# Patient Record
Sex: FEMALE | Race: WHITE | NOT HISPANIC OR LATINO
[De-identification: names, ages, dates, MRNs, and addresses within clinical notes are randomized per-mention and may not be internally consistent; named-entity substitution may affect disease eponyms.]

---

## 2020-10-16 ENCOUNTER — APPOINTMENT (OUTPATIENT)
Dept: CARDIOLOGY | Facility: CLINIC | Age: 80
End: 2020-10-16
Payer: MEDICARE

## 2020-10-16 ENCOUNTER — NON-APPOINTMENT (OUTPATIENT)
Age: 80
End: 2020-10-16

## 2020-10-16 VITALS
WEIGHT: 154 LBS | BODY MASS INDEX: 29.07 KG/M2 | DIASTOLIC BLOOD PRESSURE: 64 MMHG | HEIGHT: 61 IN | HEART RATE: 78 BPM | SYSTOLIC BLOOD PRESSURE: 160 MMHG | OXYGEN SATURATION: 97 %

## 2020-10-16 DIAGNOSIS — Z82.49 FAMILY HISTORY OF ISCHEMIC HEART DISEASE AND OTHER DISEASES OF THE CIRCULATORY SYSTEM: ICD-10-CM

## 2020-10-16 DIAGNOSIS — Z87.891 PERSONAL HISTORY OF NICOTINE DEPENDENCE: ICD-10-CM

## 2020-10-16 DIAGNOSIS — Z80.1 FAMILY HISTORY OF MALIGNANT NEOPLASM OF TRACHEA, BRONCHUS AND LUNG: ICD-10-CM

## 2020-10-16 DIAGNOSIS — Z80.6 FAMILY HISTORY OF LEUKEMIA: ICD-10-CM

## 2020-10-16 DIAGNOSIS — Z83.6 FAMILY HISTORY OF OTHER DISEASES OF THE RESPIRATORY SYSTEM: ICD-10-CM

## 2020-10-16 DIAGNOSIS — R09.89 OTHER SPECIFIED SYMPTOMS AND SIGNS INVOLVING THE CIRCULATORY AND RESPIRATORY SYSTEMS: ICD-10-CM

## 2020-10-16 DIAGNOSIS — Z78.9 OTHER SPECIFIED HEALTH STATUS: ICD-10-CM

## 2020-10-16 PROCEDURE — 99204 OFFICE O/P NEW MOD 45 MIN: CPT

## 2020-10-16 PROCEDURE — 93000 ELECTROCARDIOGRAM COMPLETE: CPT

## 2020-10-16 RX ORDER — ACETAMINOPHEN 500 MG
1200-1000 TABLET ORAL
Refills: 0 | Status: ACTIVE | COMMUNITY
Start: 2020-10-16

## 2020-10-16 RX ORDER — PANTOPRAZOLE 40 MG/1
40 TABLET, DELAYED RELEASE ORAL DAILY
Qty: 30 | Refills: 0 | Status: ACTIVE | COMMUNITY
Start: 2020-10-16

## 2020-10-16 RX ORDER — CHOLECALCIFEROL (VITAMIN D3) 25 MCG
25 MCG TABLET,CHEWABLE ORAL
Refills: 0 | Status: ACTIVE | COMMUNITY
Start: 2020-10-16

## 2020-10-16 RX ORDER — ASPIRIN ENTERIC COATED TABLETS 81 MG 81 MG/1
81 TABLET, DELAYED RELEASE ORAL
Qty: 90 | Refills: 3 | Status: ACTIVE | COMMUNITY
Start: 2020-10-16

## 2020-10-16 RX ORDER — NICOTINE POLACRILEX 2 MG
1 GUM BUCCAL
Refills: 0 | Status: ACTIVE | COMMUNITY
Start: 2020-10-16

## 2020-10-16 RX ORDER — MULTIVITAMIN
CAPSULE ORAL
Refills: 0 | Status: ACTIVE | COMMUNITY
Start: 2020-10-16

## 2020-10-16 RX ORDER — OLIVE LEAF EXTRACT 250 MG
300 CAPSULE ORAL
Refills: 0 | Status: ACTIVE | COMMUNITY
Start: 2020-10-16

## 2020-10-16 NOTE — ASSESSMENT
[FreeTextEntry1] : Reviewed on October 16, 2020\par Labs September 24, 2020 hemoglobin is 9.9 MCV 80.2\par EKG September 28, 2020 normal sinus rhythm right bundle branch block\par EKG October 16, 2020 as noted above\par Recent chest x-ray September 28, 2020 no bibasilar atelectasis\par Prior cardiovascular tests available to me July 22, 2016 echo hyperdynamic LV trace mitral and tricuspid regurgitation normal PASP\par Nuclear marker perfusion scan June 18, 2012 nonischemic

## 2020-10-16 NOTE — PHYSICAL EXAM
[Normal Appearance] : normal appearance [General Appearance - Well Developed] : well developed [Well Groomed] : well groomed [General Appearance - Well Nourished] : well nourished [General Appearance - In No Acute Distress] : no acute distress [Normal Conjunctiva] : the conjunctiva exhibited no abnormalities [No Deformities] : no deformities [Eyelids - No Xanthelasma] : the eyelids demonstrated no xanthelasmas [Normal] : normal [Normal S2] : normal S2 [Normal S1] : normal S1 [No Precordial Heave] : no precordial heave was noted [Normal Rate] : normal [S4] : no S4 [S3] : no S3 [I] : a grade 1 [Right Carotid Bruit] : no bruit heard over the right carotid [1+] : left 1+ [Left Carotid Bruit] : left carotid bruit heard [Right Femoral Bruit] : no bruit heard over the right femoral artery [Left Femoral Bruit] : no bruit heard over the left femoral artery [2+] : left 2+ [No Abnormalities] : the abdominal aorta was not enlarged and no bruit was heard [___ +] : bilateral [unfilled]U+ pitting edema to the ankles [Exaggerated Use Of Accessory Muscles For Inspiration] : no accessory muscle use [Respiration, Rhythm And Depth] : normal respiratory rhythm and effort [Abdomen Soft] : soft [Auscultation Breath Sounds / Voice Sounds] : lungs were clear to auscultation bilaterally [Abnormal Walk] : normal gait [Gait - Sufficient For Exercise Testing] : the gait was sufficient for exercise testing [Nail Clubbing] : no clubbing of the fingernails [Cyanosis, Localized] : no localized cyanosis [FreeTextEntry1] : Venous stasis change venous stasis changes [] : no rash [Skin Color & Pigmentation] : normal skin color and pigmentation [No Skin Ulcers] : no skin ulcer [Mood] : the mood was normal [Affect] : the affect was normal [Oriented To Time, Place, And Person] : oriented to person, place, and time [No Anxiety] : not feeling anxious

## 2020-10-16 NOTE — HISTORY OF PRESENT ILLNESS
[FreeTextEntry1] : Medical history significant for\par 1.  Peripheral arterial disease bilateral lower extremity interventions with iliac artery stents in December, January and May\par 2.  Hyperlipidemia on low-dose of rosuvastatin\par 3.  Venous insufficiency with bilateral lower extremity edema\par 4.  Bronchospastic airway disease

## 2020-10-16 NOTE — DISCUSSION/SUMMARY
[FreeTextEntry1] : 79-year-old female with above medical history active medical problems as noted below\par 1.  Left carotid bruit.  Carotid Doppler study in presence of generalized vascular disease\par 2.  Increasing ankle edema bilaterally lower extremity.  More related to venous and arterial insufficiency.  Rule out cardiovascular etiology with echocardiogram.  Torsemide as advised as needed.  She will let us know after use of it over the next 3 days.  Labs to be ordered.  She will follow her weight.  Low-salt diet.  Leg elevation.\par 3.  Chronic bifascicular block.\par 4.  Peripheral arterial disease.  Bilateral intervention.  Lipid panel.  If LDL cholesterol greater than 70 increase rosuvastatin to 20 mg.  Increasing activity exercise would be helpful.  Aspirin 81 mg as long as no significant contraindication lifelong.\par 5.  Bilateral venous insufficiency.  Prior intervention.  Compression stockings recommended\par 6.  Once stable in relation to anemia we will consider ischemic evaluation considering significant peripheral arterial disease with intervention high likelihood of coronary artery disease\par \par Counseling regarding low saturated fat, salt and carbohydrate intake was reviewed. Active lifestyle and regular. Exercise along with weight management is advised.\par All the above were at length reviewed. Answered all the questions. Thank you very much for this kind referral. Please do not hesitate to give me a call for any question.\par Part of this transcription was done with voice recognition software and phonetically similar errors are common. I apologize for that. Please do not hesitate to call for any questions due to above.\par

## 2020-10-16 NOTE — REASON FOR VISIT
[Consultation] : a consultation regarding [Abnormal ECG] : an abnormal ECG [Peripheral Vascular Disease] : peripheral vascular disease [Hyperlipidemia] : hyperlipidemia [FreeTextEntry1] : 79-year-old female comes in for consultation.  Referred in relation to cardiovascular evaluation management in presence of history of peripheral arterial and venous disease with multiple intervention bilaterally in lower extremity for both venous insufficiency and arterial insufficiency, right bundle branch block, hyperlipidemia.\par Her activity level is limited\par She has no chest pain PND orthopnea\par She has bilateral lower extremity edema as she has not been taking her torsemide\par She also has significant anemia which is being evaluated with hematologist\par So far no significant etiology noted\par She has no palpitation dizziness syncopal event\par She wears intermittent compression stockings\par She had recent hospital admission for anemia with transfusion\par

## 2020-10-23 ENCOUNTER — APPOINTMENT (OUTPATIENT)
Dept: CARDIOLOGY | Facility: CLINIC | Age: 80
End: 2020-10-23
Payer: MEDICARE

## 2020-10-23 PROCEDURE — 93306 TTE W/DOPPLER COMPLETE: CPT

## 2020-10-23 PROCEDURE — 93880 EXTRACRANIAL BILAT STUDY: CPT

## 2020-10-27 ENCOUNTER — NON-APPOINTMENT (OUTPATIENT)
Age: 80
End: 2020-10-27

## 2020-11-13 ENCOUNTER — APPOINTMENT (OUTPATIENT)
Dept: CARDIOLOGY | Facility: CLINIC | Age: 80
End: 2020-11-13
Payer: MEDICARE

## 2020-11-13 VITALS
SYSTOLIC BLOOD PRESSURE: 142 MMHG | HEART RATE: 85 BPM | WEIGHT: 153 LBS | HEIGHT: 61 IN | OXYGEN SATURATION: 98 % | DIASTOLIC BLOOD PRESSURE: 62 MMHG | BODY MASS INDEX: 28.89 KG/M2

## 2020-11-13 DIAGNOSIS — E04.1 NONTOXIC SINGLE THYROID NODULE: ICD-10-CM

## 2020-11-13 PROCEDURE — 99215 OFFICE O/P EST HI 40 MIN: CPT

## 2020-11-13 RX ORDER — TORSEMIDE 20 MG/1
20 TABLET ORAL DAILY
Refills: 0 | Status: DISCONTINUED | COMMUNITY
Start: 2020-10-16 | End: 2020-11-13

## 2020-11-13 NOTE — ASSESSMENT
[FreeTextEntry1] : Reviewed on October 16, 2020\par Labs September 24, 2020 hemoglobin is 9.9 MCV 80.2\par EKG September 28, 2020 normal sinus rhythm right bundle branch block\par EKG October 16, 2020 as noted above\par Recent chest x-ray September 28, 2020 no bibasilar atelectasis\par Prior cardiovascular tests available to me July 22, 2016 echo hyperdynamic LV trace mitral and tricuspid regurgitation normal PASP\par Nuclear marker perfusion scan June 18, 2012 nonischemic\par \par Reviewed November 13, 2020\par Labs from October 15, 2020 were reviewed\par Echocardiogram October 23, 2020 as noted above\par Carotid Doppler study October 23, 2020 protruding atheroma noted.

## 2020-11-13 NOTE — PHYSICAL EXAM
[General Appearance - Well Developed] : well developed [Normal Appearance] : normal appearance [Well Groomed] : well groomed [General Appearance - Well Nourished] : well nourished [No Deformities] : no deformities [General Appearance - In No Acute Distress] : no acute distress [Normal Conjunctiva] : the conjunctiva exhibited no abnormalities [Eyelids - No Xanthelasma] : the eyelids demonstrated no xanthelasmas [Respiration, Rhythm And Depth] : normal respiratory rhythm and effort [Exaggerated Use Of Accessory Muscles For Inspiration] : no accessory muscle use [Auscultation Breath Sounds / Voice Sounds] : lungs were clear to auscultation bilaterally [Abdomen Soft] : soft [Abnormal Walk] : normal gait [Gait - Sufficient For Exercise Testing] : the gait was sufficient for exercise testing [Nail Clubbing] : no clubbing of the fingernails [Cyanosis, Localized] : no localized cyanosis [Skin Color & Pigmentation] : normal skin color and pigmentation [] : no rash [No Skin Ulcers] : no skin ulcer [Oriented To Time, Place, And Person] : oriented to person, place, and time [Affect] : the affect was normal [Mood] : the mood was normal [No Anxiety] : not feeling anxious [Normal] : normal [No Precordial Heave] : no precordial heave was noted [Normal Rate] : normal [Normal S1] : normal S1 [Normal S2] : normal S2 [I] : a grade 1 [1+] : left 1+ [Left Carotid Bruit] : left carotid bruit heard [2+] : left 2+ [No Abnormalities] : the abdominal aorta was not enlarged and no bruit was heard [___ +] : bilateral [unfilled]U+ pitting edema to the ankles [FreeTextEntry1] : Venous stasis change venous stasis changes [S3] : no S3 [S4] : no S4 [Right Carotid Bruit] : no bruit heard over the right carotid [Right Femoral Bruit] : no bruit heard over the right femoral artery [Left Femoral Bruit] : no bruit heard over the left femoral artery

## 2020-11-13 NOTE — REASON FOR VISIT
[Follow-Up - Clinic] : a clinic follow-up of [Abnormal ECG] : an abnormal ECG [Hyperlipidemia] : hyperlipidemia [Peripheral Vascular Disease] : peripheral vascular disease [FreeTextEntry1] : 80-year-old comes in for follow-up consultation after echocardiogram carotid Doppler study and labs.  History of peripheral arterial and venous disease with multiple intervention bilaterally in lower extremity for both venous insufficiency and arterial insufficiency, right bundle branch block, hyperlipidemia.\par Her edema is relatively stable on torsemide which she takes it as needed based on the weight.  She will also continue to wear compression stockings.\par Her activity level is limited\par She has no chest pain PND orthopnea\par She has bilateral lower extremity edema as she has not been taking her torsemide\par She also has significant anemia which is being evaluated with hematologist\par So far no significant etiology noted\par She has no palpitation dizziness syncopal event\par She had recent hospital admission for anemia with transfusion\par

## 2020-11-13 NOTE — DISCUSSION/SUMMARY
[FreeTextEntry1] : 80-year-old female with above medical history active medical problems as noted below\par 1.  Protruding atheroma bilateral carotids.  Nonobstructive.  Increase rosuvastatin to 20 mg.  Follow-up with vascular surgery.\par 2.  Ankle edema.  Compression stockings.  Follow-up with vascular regarding venous and arterial insufficiency.  Torsemide as needed.\par 3.  Chronic bifascicular block.  No syncope.  Closely follow.\par 4.  Peripheral arterial disease.  Bilateral intervention.  Lipid panel.  If LDL cholesterol greater than 70 increase rosuvastatin to 20 mg.  Increasing activity exercise would be helpful.  Aspirin 81 mg as long as no significant contraindication lifelong.\par 5.  Bilateral venous insufficiency.  Prior intervention.  Compression stockings recommended\par 6.  Elevated blood pressure.  In presence significant generalized vascular disease.  Losartan/hydrochlorthiazide ordered.  This will minimize the use of torsemide.  Side effects risk benefits alternatives reviewed.  Repeat labs ordered in the next 3 to 4 weeks.  Any side effects she will contact us immediately.\par In future once stable in relation to anemia we will consider ischemic evaluation considering significant peripheral arterial disease with intervention high likelihood of coronary artery disease\par #7 thyroid nodule.  Thyroid ultrasound ordered.  If abnormality noted consider endocrinology follow-up\par \par Counseling regarding low saturated fat, salt and carbohydrate intake was reviewed. Active lifestyle and regular. Exercise along with weight management is advised.\par All the above were at length reviewed. Answered all the questions. Thank you very much for this kind referral. Please do not hesitate to give me a call for any question.\par Part of this transcription was done with voice recognition software and phonetically similar errors are common. I apologize for that. Please do not hesitate to call for any questions due to above.\par

## 2020-11-24 ENCOUNTER — NON-APPOINTMENT (OUTPATIENT)
Age: 80
End: 2020-11-24

## 2021-01-10 ENCOUNTER — NON-APPOINTMENT (OUTPATIENT)
Age: 81
End: 2021-01-10

## 2021-01-12 ENCOUNTER — APPOINTMENT (OUTPATIENT)
Dept: CARDIOLOGY | Facility: CLINIC | Age: 81
End: 2021-01-12

## 2021-01-26 ENCOUNTER — APPOINTMENT (OUTPATIENT)
Dept: CARDIOLOGY | Facility: CLINIC | Age: 81
End: 2021-01-26
Payer: MEDICARE

## 2021-01-26 VITALS
WEIGHT: 153 LBS | SYSTOLIC BLOOD PRESSURE: 112 MMHG | TEMPERATURE: 98.4 F | OXYGEN SATURATION: 94 % | HEIGHT: 61 IN | HEART RATE: 93 BPM | DIASTOLIC BLOOD PRESSURE: 60 MMHG | BODY MASS INDEX: 28.89 KG/M2

## 2021-01-26 DIAGNOSIS — I95.2 HYPOTENSION DUE TO DRUGS: ICD-10-CM

## 2021-01-26 DIAGNOSIS — K21.9 GASTRO-ESOPHAGEAL REFLUX DISEASE W/OUT ESOPHAGITIS: ICD-10-CM

## 2021-01-26 DIAGNOSIS — R60.0 LOCALIZED EDEMA: ICD-10-CM

## 2021-01-26 PROCEDURE — 99214 OFFICE O/P EST MOD 30 MIN: CPT

## 2021-01-26 RX ORDER — MIDODRINE HYDROCHLORIDE 2.5 MG/1
2.5 TABLET ORAL
Refills: 0 | Status: DISCONTINUED | COMMUNITY
End: 2021-01-26

## 2021-01-26 RX ORDER — LOSARTAN POTASSIUM AND HYDROCHLOROTHIAZIDE 12.5; 5 MG/1; MG/1
50-12.5 TABLET ORAL
Qty: 90 | Refills: 3 | Status: DISCONTINUED | COMMUNITY
Start: 2020-11-13 | End: 2021-01-26

## 2021-01-26 NOTE — ASSESSMENT
[FreeTextEntry1] : Hanny is an 80-year-old female with medical history detailed above and active medical issues including:\par \par -DCHF, HFpEF LVEF 65% echo Oct 2020. Persistent +2 bipedal edema in the setting of hypotension.  Patient started on midodrine 2.5 mg 3 times daily and torsemide 5 mg daily.  Patient instructed to maintain low salt diet, elevate legs when nonambulatory, knee-high compression stockings during the day and with prolonged travel.\par \par - Adrenal insufficiency work-up in progress with PCP.\par \par -PVD lateral femoral artery stents\par \par -COPD on home oxygen\par \par -History of anemia multiple transfusions September 2020\par \par -History of Billroth II surgery\par \par -Admission [facility] 1/15/21 urosepsis and pneumonia.  Readmission 1/22/2021 dizziness, hypotension, nausea vomiting, edema\par \par Advised patient to follow active lifestyle with regular cardiovascular exercise. Patient educated on lifestyle and diet modification with low sodium low fat diet and avoidance of excessive alcohol. Patient is aware to call with any symptoms or concerns. \par \par Hanny will follow up with [facility] for primary care

## 2021-01-26 NOTE — PHYSICAL EXAM
[General Appearance - Well Developed] : well developed [Normal Appearance] : normal appearance [Well Groomed] : well groomed [General Appearance - Well Nourished] : well nourished [No Deformities] : no deformities [General Appearance - In No Acute Distress] : no acute distress [Normal Conjunctiva] : the conjunctiva exhibited no abnormalities [Eyelids - No Xanthelasma] : the eyelids demonstrated no xanthelasmas [Respiration, Rhythm And Depth] : normal respiratory rhythm and effort [Exaggerated Use Of Accessory Muscles For Inspiration] : no accessory muscle use [Auscultation Breath Sounds / Voice Sounds] : lungs were clear to auscultation bilaterally [Abdomen Soft] : soft [Abnormal Walk] : normal gait [Gait - Sufficient For Exercise Testing] : the gait was sufficient for exercise testing [Nail Clubbing] : no clubbing of the fingernails [Cyanosis, Localized] : no localized cyanosis [Skin Color & Pigmentation] : normal skin color and pigmentation [No Skin Ulcers] : no skin ulcer [] : no rash [Oriented To Time, Place, And Person] : oriented to person, place, and time [Affect] : the affect was normal [Mood] : the mood was normal [No Anxiety] : not feeling anxious [Normal] : normal [No Precordial Heave] : no precordial heave was noted [Normal Rate] : normal [Normal S1] : normal S1 [Normal S2] : normal S2 [I] : a grade 1 [1+] : left 1+ [Left Carotid Bruit] : left carotid bruit heard [2+] : left 2+ [No Abnormalities] : the abdominal aorta was not enlarged and no bruit was heard [___ +] : bilateral [unfilled]U+ pitting edema to the ankles [Normal Oral Mucosa] : normal oral mucosa [No Oral Pallor] : no oral pallor [No Oral Cyanosis] : no oral cyanosis [Normal Jugular Venous A Waves Present] : normal jugular venous A waves present [Normal Jugular Venous V Waves Present] : normal jugular venous V waves present [No Jugular Venous Gomez A Waves] : no jugular venous gomez A waves [Heart Rate And Rhythm] : heart rate and rhythm were normal [Heart Sounds] : normal S1 and S2 [Murmurs] : no murmurs present [FreeTextEntry1] : Venous stasis change venous stasis changes [S3] : no S3 [S4] : no S4 [Right Carotid Bruit] : no bruit heard over the right carotid [Right Femoral Bruit] : no bruit heard over the right femoral artery [Left Femoral Bruit] : no bruit heard over the left femoral artery

## 2021-01-26 NOTE — REVIEW OF SYSTEMS
[see HPI] : see HPI [Joint Pain] : joint pain [Joint Stiffness] : joint stiffness [Negative] : Heme/Lymph [Lower Ext Edema] : lower extremity edema

## 2021-01-26 NOTE — REASON FOR VISIT
[Follow-Up - Clinic] : a clinic follow-up of [Abnormal ECG] : an abnormal ECG [Hyperlipidemia] : hyperlipidemia [Peripheral Vascular Disease] : peripheral vascular disease [FreeTextEntry1] : Hanny is an 80-year-old female with history of COPD home oxygen, DCHF, HFpEF LVEF 65% echo Oct 2020, pedal edema, PVD bilateral FA stents, anemia transfusion September 2020, Billroth II surgery, [facility] admission 1/15/21 urosepsis pneumonia readmission 1/22/2021 weakness, dizziness, nausea vomiting, hypotension, currently evaluation for adrenal insufficiency.\par \par Patient has persistent +2 bipedal edema, and borderline hypotension at home  today.  Cardiovascular review of symptoms is negative for exertional chest pain, dyspnea, palpitations, dizziness or syncope.  No PND or orthopnea.  No bleeding or black stool.\par \par No exercise routine.  Patient is walking 10 minutes without chest pain or dyspnea.\par \par EKG [facility] 1/21/2021, sinus rhythm PACs RBBB\par \par Echocardiogram October 2020, LVEF 65%, mild diastolic dysfunction, mild MR and TR, mild to moderate MS. mild pulmonary hypertension RVSP 42 mmHg\par \par

## 2021-02-19 ENCOUNTER — APPOINTMENT (OUTPATIENT)
Dept: CARDIOLOGY | Facility: CLINIC | Age: 81
End: 2021-02-19

## 2021-02-22 ENCOUNTER — OUTPATIENT (OUTPATIENT)
Dept: OUTPATIENT SERVICES | Facility: HOSPITAL | Age: 81
LOS: 1 days | End: 2021-02-22

## 2021-02-24 ENCOUNTER — OUTPATIENT (OUTPATIENT)
Dept: OUTPATIENT SERVICES | Facility: HOSPITAL | Age: 81
LOS: 1 days | End: 2021-02-24
Payer: MEDICARE

## 2021-02-24 PROCEDURE — 76775 US EXAM ABDO BACK WALL LIM: CPT | Mod: 26

## 2021-03-02 ENCOUNTER — APPOINTMENT (OUTPATIENT)
Dept: CARDIOLOGY | Facility: CLINIC | Age: 81
End: 2021-03-02

## 2021-08-20 ENCOUNTER — OUTPATIENT (OUTPATIENT)
Dept: OUTPATIENT SERVICES | Facility: HOSPITAL | Age: 81
LOS: 1 days | End: 2021-08-20

## 2021-10-06 ENCOUNTER — OUTPATIENT (OUTPATIENT)
Dept: OUTPATIENT SERVICES | Facility: HOSPITAL | Age: 81
LOS: 1 days | End: 2021-10-06

## 2022-05-20 ENCOUNTER — APPOINTMENT (OUTPATIENT)
Dept: CARDIOLOGY | Facility: CLINIC | Age: 82
End: 2022-05-20
Payer: MEDICARE

## 2022-05-20 VITALS
WEIGHT: 142 LBS | HEIGHT: 61 IN | OXYGEN SATURATION: 95 % | DIASTOLIC BLOOD PRESSURE: 58 MMHG | HEART RATE: 83 BPM | BODY MASS INDEX: 26.81 KG/M2 | SYSTOLIC BLOOD PRESSURE: 130 MMHG | TEMPERATURE: 97.1 F

## 2022-05-20 DIAGNOSIS — R03.0 ELEVATED BLOOD-PRESSURE READING, W/OUT DIAGNOSIS OF HYPERTENSION: ICD-10-CM

## 2022-05-20 DIAGNOSIS — M25.471 EFFUSION, RIGHT ANKLE: ICD-10-CM

## 2022-05-20 DIAGNOSIS — M25.472 EFFUSION, RIGHT ANKLE: ICD-10-CM

## 2022-05-20 PROCEDURE — 99214 OFFICE O/P EST MOD 30 MIN: CPT

## 2022-05-20 PROCEDURE — 93000 ELECTROCARDIOGRAM COMPLETE: CPT

## 2022-05-20 RX ORDER — MIDODRINE HYDROCHLORIDE 2.5 MG/1
2.5 TABLET ORAL 3 TIMES DAILY
Qty: 270 | Refills: 0 | Status: DISCONTINUED | COMMUNITY
Start: 2021-01-26 | End: 2022-05-20

## 2022-05-20 RX ORDER — TORSEMIDE 5 MG/1
5 TABLET ORAL DAILY
Qty: 90 | Refills: 1 | Status: DISCONTINUED | COMMUNITY
End: 2022-05-20

## 2022-05-20 RX ORDER — TIOTROPIUM BROMIDE 18 UG/1
18 CAPSULE ORAL; RESPIRATORY (INHALATION) DAILY
Refills: 0 | Status: DISCONTINUED | COMMUNITY
Start: 2020-10-16 | End: 2022-05-20

## 2022-05-20 RX ORDER — ROSUVASTATIN CALCIUM 40 MG/1
40 TABLET, FILM COATED ORAL
Qty: 90 | Refills: 3 | Status: ACTIVE | COMMUNITY
Start: 2020-10-16

## 2022-05-20 NOTE — REASON FOR VISIT
[Follow-Up - Clinic] : a clinic follow-up of [Abnormal ECG] : an abnormal ECG [Hyperlipidemia] : hyperlipidemia [Peripheral Vascular Disease] : peripheral vascular disease [FreeTextEntry1] : 81-year-old female comes in for follow-up consultation.  Reviewed available labs.  History of peripheral arterial and venous disease with multiple intervention bilaterally in lower extremity for both venous insufficiency and arterial insufficiency, right bundle branch block, hyperlipidemia.\par She is wearing compression stockings.  Off diuretics.\par Blood pressure stable now off midodrine.\par Her activity level is limited\par She has no chest pain PND orthopnea\par She has no palpitation dizziness syncopal event\par She had recent hospital admission for anemia with transfusion\par

## 2022-05-20 NOTE — ASSESSMENT
[FreeTextEntry1] : Reviewed on October 16, 2020\par Labs September 24, 2020 hemoglobin is 9.9 MCV 80.2\par EKG September 28, 2020 normal sinus rhythm right bundle branch block\par EKG October 16, 2020 as noted above\par Recent chest x-ray September 28, 2020 no bibasilar atelectasis\par Prior cardiovascular tests available to me July 22, 2016 echo hyperdynamic LV trace mitral and tricuspid regurgitation normal PASP\par Nuclear marker perfusion scan June 18, 2012 nonischemic\par \par Reviewed November 13, 2020\par Labs from October 15, 2020 were reviewed\par Echocardiogram October 23, 2020 as noted above\par Carotid Doppler study October 23, 2020 protruding atheroma noted.\par \par Reviewed on May 20, 2022.\par Labs from May 17, 2022 which includes hemoglobin of 12.7.  Creatinine 1.24 potassium 4.1 sodium 137

## 2022-05-20 NOTE — HISTORY OF PRESENT ILLNESS
[FreeTextEntry1] : Medical history significant for\par 1.  Peripheral arterial disease bilateral lower extremity interventions with iliac artery stents in December, January and May 2019 and 2020\par 2.  Hyperlipidemia on high intensity statin\par 3.  Venous insufficiency with bilateral lower extremity edema\par 4.  Bronchospastic airway disease

## 2022-05-20 NOTE — REVIEW OF SYSTEMS
[Joint Pain] : joint pain [Joint Stiffness] : joint stiffness [Negative] : Heme/Lymph [FreeTextEntry5] : Improved claudication.  Dyspnea on exertion.  No chest pain.  No palpitations.  No PND orthopnea

## 2022-05-20 NOTE — PHYSICAL EXAM
[General Appearance - Well Developed] : well developed [Normal Appearance] : normal appearance [Well Groomed] : well groomed [General Appearance - Well Nourished] : well nourished [No Deformities] : no deformities [General Appearance - In No Acute Distress] : no acute distress [Respiration, Rhythm And Depth] : normal respiratory rhythm and effort [Exaggerated Use Of Accessory Muscles For Inspiration] : no accessory muscle use [Auscultation Breath Sounds / Voice Sounds] : lungs were clear to auscultation bilaterally [Abnormal Walk] : normal gait [Gait - Sufficient For Exercise Testing] : the gait was sufficient for exercise testing [Nail Clubbing] : no clubbing of the fingernails [Cyanosis, Localized] : no localized cyanosis [Skin Color & Pigmentation] : normal skin color and pigmentation [] : no rash [No Skin Ulcers] : no skin ulcer [FreeTextEntry1] : Venous stasis change venous stasis changes

## 2022-05-23 ENCOUNTER — NON-APPOINTMENT (OUTPATIENT)
Age: 82
End: 2022-05-23

## 2022-06-07 ENCOUNTER — APPOINTMENT (OUTPATIENT)
Dept: CARDIOLOGY | Facility: CLINIC | Age: 82
End: 2022-06-07
Payer: MEDICARE

## 2022-06-07 PROCEDURE — 93306 TTE W/DOPPLER COMPLETE: CPT

## 2022-06-14 ENCOUNTER — RESULT CHARGE (OUTPATIENT)
Age: 82
End: 2022-06-14

## 2022-06-15 ENCOUNTER — APPOINTMENT (OUTPATIENT)
Dept: CARDIOLOGY | Facility: CLINIC | Age: 82
End: 2022-06-15
Payer: MEDICARE

## 2022-06-15 ENCOUNTER — NON-APPOINTMENT (OUTPATIENT)
Age: 82
End: 2022-06-15

## 2022-06-15 VITALS
SYSTOLIC BLOOD PRESSURE: 138 MMHG | DIASTOLIC BLOOD PRESSURE: 68 MMHG | OXYGEN SATURATION: 95 % | HEIGHT: 61 IN | TEMPERATURE: 97.1 F | WEIGHT: 143 LBS | HEART RATE: 96 BPM | BODY MASS INDEX: 27 KG/M2

## 2022-06-15 DIAGNOSIS — I45.10 UNSPECIFIED RIGHT BUNDLE-BRANCH BLOCK: ICD-10-CM

## 2022-06-15 DIAGNOSIS — Z95.828 PRESENCE OF OTHER VASCULAR IMPLANTS AND GRAFTS: ICD-10-CM

## 2022-06-15 PROCEDURE — 99214 OFFICE O/P EST MOD 30 MIN: CPT

## 2022-06-15 NOTE — HISTORY OF PRESENT ILLNESS
[FreeTextEntry1] : KAYLEEN ALBA  is a 81 year F  who presents today Jerome 15, 2022 for clinical follow-up.  Overall she has been feeling well. There has been no recent illness or hospital stay. Medications have remained unchanged. Asymptomatic from cardiovascular and arrhythmia standpoint. \par Walking with no new exertional complaints. Main complaint is fatigue in her legs. Following closely with vascular team. \par \par Today she denies chest pain, pressure, unusual shortness of breath, lightheadedness, dizziness, near syncope or syncope. \par \par \par Medical history significant for\par 1.  Peripheral arterial disease bilateral lower extremity interventions with iliac artery stents in December, January and May 2019 and 2020\par 2.  Hyperlipidemia on high intensity statin\par 3.  Venous insufficiency with bilateral lower extremity edema\par 4.  Bronchospastic airway disease

## 2022-06-15 NOTE — PHYSICAL EXAM
[General Appearance - Well Developed] : well developed [Normal Appearance] : normal appearance [Well Groomed] : well groomed [General Appearance - Well Nourished] : well nourished [No Deformities] : no deformities [General Appearance - In No Acute Distress] : no acute distress [Respiration, Rhythm And Depth] : normal respiratory rhythm and effort [Exaggerated Use Of Accessory Muscles For Inspiration] : no accessory muscle use [Auscultation Breath Sounds / Voice Sounds] : lungs were clear to auscultation bilaterally [Abnormal Walk] : normal gait [Gait - Sufficient For Exercise Testing] : the gait was sufficient for exercise testing [Cyanosis, Localized] : no localized cyanosis [Nail Clubbing] : no clubbing of the fingernails [FreeTextEntry1] : Venous stasis change venous stasis changes [Skin Color & Pigmentation] : normal skin color and pigmentation [] : no rash [No Skin Ulcers] : no skin ulcer

## 2022-06-15 NOTE — REASON FOR VISIT
[Follow-Up - Clinic] : a clinic follow-up of [Abnormal ECG] : an abnormal ECG [Hyperlipidemia] : hyperlipidemia [Peripheral Vascular Disease] : peripheral vascular disease

## 2022-06-15 NOTE — CARDIOLOGY SUMMARY
[de-identified] : Echo 6/7/22 Ef 65%, mod mitral stenosis  [___] : [unfilled] [LVEF ___%] : LVEF [unfilled]% [Enlarged] : enlarged LA size [Mild] : mild mitral regurgitation

## 2022-06-15 NOTE — DISCUSSION/SUMMARY
[FreeTextEntry1] : KAYLEEN ALBA  is a 81 year F  who presents today Jerome 15, 2022 with the above history and the following active issues. \par \par Multivessel arterial disease. Echo with normal LVEF and moderate MS. Recommended follow-up echocardiogram in one year. At last visit nuclear stress test recommended and patient declined. \par \par Chronic bifascicular block.  No syncope.  Closely follow.\par \par Peripheral arterial disease.  Bilateral intervention.  Continue aspirin/high intensity statin therapy.  Increasing activity exercise would be helpful.  Aspirin 81 mg as long as no significant contraindication lifelong.\par \par Bilateral venous insufficiency.  Prior intervention.  Compression stockings recommended\par \par Blood pressure stable.  In the past significant hypotension.  Requiring midodrine use.\par \par Bilateral protruding carotid atheroma.  Follow-up with carotid Doppler study.\par \par Red flag symptoms which would warrant sooner emergent evaluation reviewed with the patient. \par Questions and concerns were addressed and answered. \par Limitations of non-invasive testing reviewed. \par \par Sincerely,\par \par Ana Paula Jones PA-C\par Patients history, testing and plan reviewed with supervising MD: Dr. Kailee Diaz

## 2023-05-30 ENCOUNTER — APPOINTMENT (OUTPATIENT)
Dept: CARDIOLOGY | Facility: CLINIC | Age: 83
End: 2023-05-30
Payer: MEDICARE

## 2023-05-30 VITALS
OXYGEN SATURATION: 97 % | DIASTOLIC BLOOD PRESSURE: 72 MMHG | BODY MASS INDEX: 28.13 KG/M2 | WEIGHT: 149 LBS | HEIGHT: 61 IN | RESPIRATION RATE: 12 BRPM | HEART RATE: 80 BPM | SYSTOLIC BLOOD PRESSURE: 128 MMHG

## 2023-05-30 DIAGNOSIS — I05.2 RHEUMATIC MITRAL STENOSIS WITH INSUFFICIENCY: ICD-10-CM

## 2023-05-30 DIAGNOSIS — I73.9 PERIPHERAL VASCULAR DISEASE, UNSPECIFIED: ICD-10-CM

## 2023-05-30 DIAGNOSIS — E78.00 PURE HYPERCHOLESTEROLEMIA, UNSPECIFIED: ICD-10-CM

## 2023-05-30 DIAGNOSIS — I65.23 OCCLUSION AND STENOSIS OF BILATERAL CAROTID ARTERIES: ICD-10-CM

## 2023-05-30 PROCEDURE — 99214 OFFICE O/P EST MOD 30 MIN: CPT

## 2023-05-30 RX ORDER — ACETAMINOPHEN AND CODEINE PHOSPHATE 300; 30 MG/1; MG/1
300-30 TABLET ORAL
Qty: 16 | Refills: 0 | Status: ACTIVE | COMMUNITY
Start: 2023-05-09

## 2023-05-30 RX ORDER — IBANDRONATE SODIUM 150 MG/1
150 TABLET ORAL
Qty: 1 | Refills: 0 | Status: ACTIVE | COMMUNITY
Start: 2023-05-23

## 2023-05-30 RX ORDER — ALBUTEROL SULFATE 90 UG/1
108 (90 BASE) INHALANT RESPIRATORY (INHALATION)
Qty: 7 | Refills: 0 | Status: ACTIVE | COMMUNITY
Start: 2023-01-28

## 2023-05-30 RX ORDER — FLUTICASONE FUROATE, UMECLIDINIUM BROMIDE AND VILANTEROL TRIFENATATE 200; 62.5; 25 UG/1; UG/1; UG/1
200-62.5-25 POWDER RESPIRATORY (INHALATION)
Refills: 0 | Status: ACTIVE | COMMUNITY

## 2023-05-30 NOTE — DISCUSSION/SUMMARY
[FreeTextEntry1] : KAYLEEN ALBA  is a 82 year F  who presents today May 30 8/20/2023 with the above history and the following active issues. \par \par Multivessel arterial disease. Echo with normal LVEF and moderate MS. Recommended follow-up echocardiogram . At last visit nuclear stress test recommended and patient declined. \par \par Chronic bifascicular block.  No syncope.  Closely follow.  EKG done in [place] Will obtain\par \par Peripheral arterial disease.  Bilateral intervention.  Continue aspirin/high intensity statin therapy.  Increasing activity exercise would be helpful.  Aspirin 81 mg as long as no significant contraindication lifelong.\par \par Dyslipidemia on rosuvastatin tolerating it well LDL goal less than 70\par \par Bilateral venous insufficiency.  Prior intervention.  Compression stockings recommended\par \par Blood pressure stable.  In the past significant hypotension.  Requiring midodrine use.\par \par Bilateral protruding carotid atheroma.  Follow-up with carotid Doppler study.\par \par Red flag symptoms which would warrant sooner emergent evaluation reviewed with the patient. \par Questions and concerns were addressed and answered. \par Counseling regarding low saturated fat, salt and carbohydrate intake was reviewed. Active lifestyle and regular. Exercise along with weight management is advised.\par All the above were at length reviewed. Answered all the questions. Thank you very much for this kind referral. Please do not hesitate to give me a call for any question.\par Part of this transcription was done with voice recognition software and phonetically similar errors are common. I apologize for that. Please do not hesitate to call for any questions due to above.\par \par Sincerely,\par Kailee Diaz MD,FACC,ARIAN\par

## 2023-05-30 NOTE — ASSESSMENT
[FreeTextEntry1] : Reviewed on May 30, 2023.\par Labs from 2023 done at [facility] creatinine 1.47 sodium 137 potassium 4.9 LFT otherwise normal CBC stable.  LDL 68 total cholesterol 146 HDL 72

## 2023-05-30 NOTE — HISTORY OF PRESENT ILLNESS
[FreeTextEntry1] : KAYLEEN ALBA  is a  82 years old female is seen today on May 30, 2023 for clinical follow-up.  She was in [place] without any significant problems.  Overall she has been feeling well. There has been no recent illness or hospital stay. Medications have remained unchanged. Asymptomatic from cardiovascular and arrhythmia standpoint. \par Walking with no new exertional complaints. Main complaint is fatigue in her legs.  Walking distances at around 150 yards.  No significant worsening.  No significant further improvement.\par \par Today she denies chest pain, pressure, unusual shortness of breath, lightheadedness, dizziness, near syncope or syncope. \par \par \par Medical history significant for\par 1.  Peripheral arterial disease bilateral lower extremity interventions with iliac artery stents in December, January and May 2019 and 2020\par 2.  Hyperlipidemia on high intensity statin\par 3.  Venous insufficiency with bilateral lower extremity edema\par 4.  Bronchospastic airway disease

## 2023-05-30 NOTE — REVIEW OF SYSTEMS
Curettage Text: The wound bed was treated with curettage after the biopsy was performed. [Joint Pain] : joint pain [Joint Stiffness] : joint stiffness [Negative] : Heme/Lymph [FreeTextEntry5] : Improved claudication.  Dyspnea on exertion.  No chest pain.  No palpitations.  No PND orthopnea

## 2023-05-30 NOTE — CARDIOLOGY SUMMARY
[___] : [unfilled] [LVEF ___%] : LVEF [unfilled]% [Enlarged] : enlarged LA size [Mild] : mild mitral regurgitation [de-identified] : Outside EKG.  Normal sinus rhythm rightward axis right bundle branch block [de-identified] : Echo 6/7/22 Ef 65%, mod mitral stenosis

## 2023-10-13 ENCOUNTER — APPOINTMENT (OUTPATIENT)
Dept: CARDIOLOGY | Facility: CLINIC | Age: 83
End: 2023-10-13

## 2024-01-23 ENCOUNTER — RX ONLY (RX ONLY)
Age: 84
End: 2024-01-23

## 2024-01-23 ENCOUNTER — OFFICE (OUTPATIENT)
Dept: URBAN - METROPOLITAN AREA CLINIC 8 | Facility: CLINIC | Age: 84
Setting detail: OPHTHALMOLOGY
End: 2024-01-23
Payer: MEDICARE

## 2024-01-23 ENCOUNTER — OFFICE (OUTPATIENT)
Dept: URBAN - METROPOLITAN AREA CLINIC 8 | Facility: CLINIC | Age: 84
Setting detail: OPHTHALMOLOGY
End: 2024-01-23

## 2024-01-23 DIAGNOSIS — H52.4: ICD-10-CM

## 2024-01-23 DIAGNOSIS — Z96.1: ICD-10-CM

## 2024-01-23 DIAGNOSIS — H90.3: ICD-10-CM

## 2024-01-23 DIAGNOSIS — H16.223: ICD-10-CM

## 2024-01-23 PROCEDURE — 92550 TYMPANOMETRY & REFLEX THRESH: CPT | Mod: AB | Performed by: AUDIOLOGIST

## 2024-01-23 PROCEDURE — 92557 COMPREHENSIVE HEARING TEST: CPT | Mod: AB | Performed by: AUDIOLOGIST

## 2024-01-23 PROCEDURE — 92015 DETERMINE REFRACTIVE STATE: CPT

## 2024-01-23 PROCEDURE — 92004 COMPRE OPH EXAM NEW PT 1/>: CPT

## 2024-01-23 ASSESSMENT — CONFRONTATIONAL VISUAL FIELD TEST (CVF)
OD_FINDINGS: FULL
OS_FINDINGS: FULL

## 2024-01-23 ASSESSMENT — SUPERFICIAL PUNCTATE KERATITIS (SPK)
OS_SPK: T
OD_SPK: T

## 2024-01-26 ASSESSMENT — SPHEQUIV_DERIVED
OD_SPHEQUIV: 0.375
OS_SPHEQUIV: 0.375
OS_SPHEQUIV: 0.375
OD_SPHEQUIV: 0.5
OD_SPHEQUIV: 0.75
OS_SPHEQUIV: 0

## 2024-01-26 ASSESSMENT — REFRACTION_CURRENTRX
OD_CYLINDER: -1.00
OS_VPRISM_DIRECTION: SV
OS_CYLINDER: -1.00
OS_SPHERE: +3.50
OS_VPRISM_DIRECTION: SV
OS_CYLINDER: -1.25
OD_VPRISM_DIRECTION: SV
OD_AXIS: 163
OS_OVR_VA: 20/
OD_OVR_VA: 20/
OD_AXIS: 178
OD_SPHERE: +4.00
OS_SPHERE: +2.75
OD_OVR_VA: 20/
OD_VPRISM_DIRECTION: SV
OD_CYLINDER: -1.25
OS_AXIS: 167
OS_AXIS: 168
OD_SPHERE: +2.50
OS_OVR_VA: 20/

## 2024-01-26 ASSESSMENT — REFRACTION_MANIFEST
OS_AXIS: 170
OS_SPHERE: +0.75
OS_VA1: 20/25+2
OD_VA2: 20/20
OS_ADD: +3.00
OS_CYLINDER: -1.75
OD_ADD: +3.00
OD_CYLINDER: -0.50
OU_VA: 20/20-
OS_VA1: 20/25+2
OD_ADD: +3.00
OS_VA2: 20/20
OD_AXIS: 160
OS_VA2: 20/20
OD_SPHERE: +1.00
OS_SPHERE: +1.25
OS_AXIS: 170
OS_ADD: +3.00
OD_AXIS: 165
OD_VA2: 20/20
OS_CYLINDER: -1.50
OD_VA1: 20/20-2
OD_VA1: 20/20-2
OU_VA: 20/20-
OD_SPHERE: +1.00
OD_CYLINDER: -1.25

## 2024-01-26 ASSESSMENT — REFRACTION_AUTOREFRACTION
OD_CYLINDER: -1.00
OD_SPHERE: +1.00
OD_AXIS: 161
OS_SPHERE: +1.25
OS_AXIS: 168
OS_CYLINDER: -1.75

## 2025-01-15 ENCOUNTER — APPOINTMENT (OUTPATIENT)
Dept: VASCULAR SURGERY | Facility: CLINIC | Age: 85
End: 2025-01-15
Payer: MEDICARE

## 2025-01-15 VITALS
HEIGHT: 61 IN | BODY MASS INDEX: 26.43 KG/M2 | DIASTOLIC BLOOD PRESSURE: 82 MMHG | SYSTOLIC BLOOD PRESSURE: 128 MMHG | WEIGHT: 140 LBS

## 2025-01-15 DIAGNOSIS — R09.89 OTHER SPECIFIED SYMPTOMS AND SIGNS INVOLVING THE CIRCULATORY AND RESPIRATORY SYSTEMS: ICD-10-CM

## 2025-01-15 DIAGNOSIS — I77.9 DISORDER OF ARTERIES AND ARTERIOLES, UNSPECIFIED: ICD-10-CM

## 2025-01-15 PROCEDURE — 99204 OFFICE O/P NEW MOD 45 MIN: CPT

## 2025-01-15 RX ORDER — IBANDRONATE SODIUM 150 MG/1
TABLET, FILM COATED ORAL
Refills: 0 | Status: ACTIVE | COMMUNITY

## 2025-01-15 RX ORDER — BACILLUS COAGULANS/INULIN 1B-250 MG
CAPSULE ORAL
Refills: 0 | Status: ACTIVE | COMMUNITY

## 2025-02-04 ENCOUNTER — APPOINTMENT (OUTPATIENT)
Dept: VASCULAR SURGERY | Facility: CLINIC | Age: 85
End: 2025-02-04
Payer: MEDICARE

## 2025-02-04 PROCEDURE — 93923 UPR/LXTR ART STDY 3+ LVLS: CPT

## 2025-02-11 ENCOUNTER — APPOINTMENT (OUTPATIENT)
Dept: VASCULAR SURGERY | Facility: CLINIC | Age: 85
End: 2025-02-11
Payer: MEDICARE

## 2025-02-11 VITALS
SYSTOLIC BLOOD PRESSURE: 124 MMHG | HEIGHT: 61 IN | WEIGHT: 140 LBS | DIASTOLIC BLOOD PRESSURE: 58 MMHG | BODY MASS INDEX: 26.43 KG/M2

## 2025-02-11 DIAGNOSIS — I65.23 OCCLUSION AND STENOSIS OF BILATERAL CAROTID ARTERIES: ICD-10-CM

## 2025-02-11 DIAGNOSIS — I73.9 PERIPHERAL VASCULAR DISEASE, UNSPECIFIED: ICD-10-CM

## 2025-02-11 PROCEDURE — 99214 OFFICE O/P EST MOD 30 MIN: CPT

## 2025-02-11 PROCEDURE — 93880 EXTRACRANIAL BILAT STUDY: CPT

## 2025-02-21 ENCOUNTER — APPOINTMENT (OUTPATIENT)
Dept: VASCULAR SURGERY | Facility: CLINIC | Age: 85
End: 2025-02-21
Payer: MEDICARE

## 2025-02-21 VITALS
HEIGHT: 61 IN | OXYGEN SATURATION: 93 % | WEIGHT: 140 LBS | DIASTOLIC BLOOD PRESSURE: 58 MMHG | BODY MASS INDEX: 26.43 KG/M2 | SYSTOLIC BLOOD PRESSURE: 124 MMHG | HEART RATE: 92 BPM

## 2025-02-21 DIAGNOSIS — M79.2 NEURALGIA AND NEURITIS, UNSPECIFIED: ICD-10-CM

## 2025-02-21 PROCEDURE — 99213 OFFICE O/P EST LOW 20 MIN: CPT

## 2025-05-07 ENCOUNTER — APPOINTMENT (OUTPATIENT)
Dept: ORTHOPEDIC SURGERY | Facility: CLINIC | Age: 85
End: 2025-05-07
Payer: MEDICARE

## 2025-05-07 PROCEDURE — 26600 TREAT METACARPAL FRACTURE: CPT

## 2025-05-07 PROCEDURE — 99204 OFFICE O/P NEW MOD 45 MIN: CPT | Mod: 57

## 2025-05-14 ENCOUNTER — APPOINTMENT (OUTPATIENT)
Dept: ORTHOPEDIC SURGERY | Facility: CLINIC | Age: 85
End: 2025-05-14
Payer: MEDICARE

## 2025-05-14 DIAGNOSIS — S62.357D NONDISPLACED FRACTURE OF SHAFT OF FIFTH METACARPAL BONE, LEFT HAND, SUBSEQUENT ENCOUNTER FOR FRACTURE WITH ROUTINE HEALING: ICD-10-CM

## 2025-05-14 PROCEDURE — 73130 X-RAY EXAM OF HAND: CPT | Mod: LT

## 2025-05-14 PROCEDURE — 99024 POSTOP FOLLOW-UP VISIT: CPT

## 2025-06-06 ENCOUNTER — APPOINTMENT (OUTPATIENT)
Dept: ORTHOPEDIC SURGERY | Facility: CLINIC | Age: 85
End: 2025-06-06
Payer: MEDICARE

## 2025-06-06 PROCEDURE — 73130 X-RAY EXAM OF HAND: CPT | Mod: LT

## 2025-06-06 PROCEDURE — 99024 POSTOP FOLLOW-UP VISIT: CPT

## 2025-08-12 ENCOUNTER — APPOINTMENT (OUTPATIENT)
Dept: VASCULAR SURGERY | Facility: CLINIC | Age: 85
End: 2025-08-12